# Patient Record
Sex: FEMALE | Race: WHITE | Employment: FULL TIME
[De-identification: names, ages, dates, MRNs, and addresses within clinical notes are randomized per-mention and may not be internally consistent; named-entity substitution may affect disease eponyms.]

---

## 2017-06-10 ENCOUNTER — HEALTH MAINTENANCE LETTER (OUTPATIENT)
Age: 27
End: 2017-06-10

## 2017-08-31 ENCOUNTER — OFFICE VISIT (OUTPATIENT)
Dept: FAMILY MEDICINE | Facility: CLINIC | Age: 27
End: 2017-08-31

## 2017-08-31 VITALS
HEIGHT: 65 IN | DIASTOLIC BLOOD PRESSURE: 80 MMHG | BODY MASS INDEX: 29.32 KG/M2 | SYSTOLIC BLOOD PRESSURE: 126 MMHG | OXYGEN SATURATION: 99 % | WEIGHT: 176 LBS | TEMPERATURE: 98.2 F | HEART RATE: 92 BPM

## 2017-08-31 DIAGNOSIS — Z30.09 ENCOUNTER FOR OTHER GENERAL COUNSELING OR ADVICE ON CONTRACEPTION: Primary | ICD-10-CM

## 2017-08-31 DIAGNOSIS — Z30.430 ENCOUNTER FOR IUD INSERTION: ICD-10-CM

## 2017-08-31 DIAGNOSIS — Z23 NEED FOR VACCINATION: ICD-10-CM

## 2017-08-31 DIAGNOSIS — F31.81 BIPOLAR 2 DISORDER (H): ICD-10-CM

## 2017-08-31 DIAGNOSIS — R61 NIGHT SWEATS: ICD-10-CM

## 2017-08-31 DIAGNOSIS — E78.2 MIXED HYPERLIPIDEMIA: ICD-10-CM

## 2017-08-31 LAB
ERYTHROCYTE [DISTWIDTH] IN BLOOD BY AUTOMATED COUNT: 11.6 %
HCT VFR BLD AUTO: 43.6 % (ref 35–47)
HEMOGLOBIN: 13.6 G/DL (ref 11.7–15.7)
MCH RBC QN AUTO: 29.4 PG (ref 26–33)
MCHC RBC AUTO-ENTMCNC: 31.2 G/DL (ref 31–36)
MCV RBC AUTO: 94.1 FL (ref 78–100)
PLATELET COUNT - QUEST: 491 10^9/L (ref 150–375)
RBC # BLD AUTO: 4.63 10*12/L (ref 3.8–5.2)
WBC # BLD AUTO: 8.6 10*9/L (ref 4–11)

## 2017-08-31 PROCEDURE — 99395 PREV VISIT EST AGE 18-39: CPT | Mod: 25 | Performed by: FAMILY MEDICINE

## 2017-08-31 PROCEDURE — 84443 ASSAY THYROID STIM HORMONE: CPT | Mod: 90 | Performed by: FAMILY MEDICINE

## 2017-08-31 PROCEDURE — 36415 COLL VENOUS BLD VENIPUNCTURE: CPT | Performed by: FAMILY MEDICINE

## 2017-08-31 PROCEDURE — 90686 IIV4 VACC NO PRSV 0.5 ML IM: CPT | Performed by: FAMILY MEDICINE

## 2017-08-31 PROCEDURE — 90471 IMMUNIZATION ADMIN: CPT | Performed by: FAMILY MEDICINE

## 2017-08-31 PROCEDURE — 85027 COMPLETE CBC AUTOMATED: CPT | Performed by: FAMILY MEDICINE

## 2017-08-31 PROCEDURE — 80061 LIPID PANEL: CPT | Mod: 90 | Performed by: FAMILY MEDICINE

## 2017-08-31 RX ORDER — NORGESTIMATE AND ETHINYL ESTRADIOL 7DAYSX3 28
1 KIT ORAL DAILY
Qty: 84 TABLET | Refills: 3 | Status: SHIPPED | OUTPATIENT
Start: 2017-08-31 | End: 2018-07-30

## 2017-08-31 ASSESSMENT — ANXIETY QUESTIONNAIRES
1. FEELING NERVOUS, ANXIOUS, OR ON EDGE: MORE THAN HALF THE DAYS
6. BECOMING EASILY ANNOYED OR IRRITABLE: SEVERAL DAYS
IF YOU CHECKED OFF ANY PROBLEMS ON THIS QUESTIONNAIRE, HOW DIFFICULT HAVE THESE PROBLEMS MADE IT FOR YOU TO DO YOUR WORK, TAKE CARE OF THINGS AT HOME, OR GET ALONG WITH OTHER PEOPLE: SOMEWHAT DIFFICULT
3. WORRYING TOO MUCH ABOUT DIFFERENT THINGS: SEVERAL DAYS
GAD7 TOTAL SCORE: 9
7. FEELING AFRAID AS IF SOMETHING AWFUL MIGHT HAPPEN: SEVERAL DAYS
2. NOT BEING ABLE TO STOP OR CONTROL WORRYING: SEVERAL DAYS
5. BEING SO RESTLESS THAT IT IS HARD TO SIT STILL: NEARLY EVERY DAY

## 2017-08-31 ASSESSMENT — PATIENT HEALTH QUESTIONNAIRE - PHQ9
SUM OF ALL RESPONSES TO PHQ QUESTIONS 1-9: 8
5. POOR APPETITE OR OVEREATING: NOT AT ALL

## 2017-08-31 NOTE — MR AVS SNAPSHOT
After Visit Summary   8/31/2017    Elizabeth Russo    MRN: [id number]           Patient Information     Date Of Birth          1990        Visit Information        Provider Department      8/31/2017 10:00 AM Maranda Alvarez MD [facility]        Today's Diagnoses     Encounter for IUD insertion    -  1    Encounter for other general counseling or advice on contraception        Mixed hyperlipidemia        Night sweats          Care Instructions    Due for pap smear in February- recheck of depression and pap smear if needed    When you are due for refill of your antidepressants- call your pharmacy to contact us.          Follow-ups after your visit        Additional Services     OB/GYN REFERRAL       Your provider has referred you to:  [facility] [place] [phone]   [email or web address]    Please be aware that coverage of these services is subject to the terms and limitations of your health insurance plan.  Call member services at your health plan with any benefit or coverage questions.      Please bring the following with you to your appointment:    (1) Any X-Rays, CTs or MRIs which have been performed.  Contact the facility where they were done to arrange for  prior to your scheduled appointment.   (2) List of current medications   (3) This referral request   (4) Any documents/labs given to you for this referral                  Who to contact     If you have questions or need follow up information about today's clinic visit or your schedule please contact [facility] directly at [phone].  Normal or non-critical lab and imaging results will be communicated to you by MyChart, letter or phone within 4 business days after the clinic has received the results. If you do not hear from us within 7 days, please contact the clinic through MyChart or phone. If you have a critical or abnormal lab result, we will  "notify you by phone as soon as possible.  Submit refill requests through GuidesMob or call your pharmacy and they will forward the refill request to us. Please allow 3 business days for your refill to be completed.          Additional Information About Your Visit        Litblochart Information     GuidesMob gives you secure access to your electronic health record. If you see a primary care provider, you can also send messages to your care team and make appointments. If you have questions, please call your primary care clinic.  If you do not have a primary care provider, please call [phone] and they will assist you.        Care EveryWhere ID     This is your Care EveryWhere ID. This could be used by other organizations to access your [facility] medical records  [id number]        Your Vitals Were     Pulse Temperature Height Last Period Pulse Oximetry Breastfeeding?    92 98.2  F (36.8  C) (Oral) 1.651 m (5' 5\") 08/28/2017 99% No    BMI (Body Mass Index)                   29.29 kg/m2            Blood Pressure from Last 3 Encounters:   08/31/17 126/80   10/29/16 120/62   10/21/15 112/78    Weight from Last 3 Encounters:   08/31/17 79.8 kg (176 lb)   10/29/16 78.7 kg (173 lb 9.6 oz)   10/21/15 73.3 kg (161 lb 9.6 oz)              We Performed the Following     HEMOGRAM/PLATELET (BFP)     Lipid Profile     OB/GYN REFERRAL     TSH with free T4 reflex ([facility])          Where to get your medicines      These medications were sent to [address] [address] [facility]  [address] [facility] [address]    Hours:  Tech issues with their phone system Phone:  [phone]     norgestim-eth estrad triphasic 0.18/0.215/0.25 MG-35 MCG per tablet          Primary Care Provider Office Phone # Fax #    Maranda Alvarez [phone] [address] [address]        Equal Access to Services     WILL CHIN AH: bianca Rodriguez, " ramon loydcristobalarlene matamoroscami fatima domin hayaan adeeg kharash la'aan ah. Arlen [facility] [phone].    ATENCIÓN: Si rigoberto ortiz, tiene a stiles disposición servicios gratuitos de asistencia lingüística. Germain al [phone].    We comply with applicable federal civil rights laws and [place] laws. We do not discriminate on the basis of race, color, national origin, age, disability sex, sexual orientation or gender identity.            Thank you!     Thank you for choosing [facility]  for your care. Our goal is always to provide you with excellent care. Hearing back from our patients is one way we can continue to improve our services. Please take a few minutes to complete the written survey that you may receive in the mail after your visit with us. Thank you!             Your Updated Medication List - Protect others around you: Learn how to safely use, store and throw away your medicines at www.disposemymeds.org.          This list is accurate as of: 8/31/17 10:54 AM.  Always use your most recent med list.                   Brand Name Dispense Instructions for use Diagnosis    norgestim-eth estrad triphasic 0.18/0.215/0.25 MG-35 MCG per tablet    TRI-SPRINTEC    84 tablet    Take 1 tablet by mouth daily    Encounter for other general counseling or advice on contraception       sertraline 100 MG tablet    ZOLOFT     Take  by mouth daily. 1.5 tabs daily        WELLBUTRIN  MG 12 hr tablet   Generic drug:  buPROPion      Take 150 mg by mouth 2 times daily.

## 2017-08-31 NOTE — NURSING NOTE
"Elizabeth Russo is here for a CPX. Fasting: possible Pap.    Pre-visit Planning  Immunizations -up to date  Colonoscopy -NA  Mammogram -NA  Asthma test --NA  PHQ9 -is completed today  PHQ2 - completed today  EILEEN 7 -done today  Fall Risk Assessment -NA  CAGE: completed today  Vitals:  BP Cuff right  Arm with regular Cuff  PULSE regular  176 lbs 0 oz and 5' 5\"  CLASSIFICATION OF OVERWEIGHT AND OBESITY BY BMI                        Obesity Class           BMI(kg/m2)  Underweight                                    < 18.5  Normal                                         18.5-24.9  Overweight                                     25.0-29.9  OBESITY                     I                  30.0-34.9                             II                 35.0-39.9  EXTREME OBESITY             III                >40      Patient's  BMI Body mass index is 29.29 kg/(m^2).  Http://hin.nhlbi.nih.gov/menuplanner/menu.cgi      Roomed By: TANA Corral ([facility])    "

## 2017-08-31 NOTE — PATIENT INSTRUCTIONS
Due for pap smear in February- recheck of depression and pap smear if needed    When you are due for refill of your antidepressants- call your pharmacy to contact us.

## 2017-08-31 NOTE — PROGRESS NOTES
Chief Complaint: Elizabeth Russo is an 27 year old woman who presents for preventive health visit.      Besides routine health maintenance,  she would like to discuss    1) she might be interested in IUD.     Refill of BCP today- [facility]: no history of dysmenorhea  Lighter period, five days duration on BCP    No history pregnancy  Monogamous, defers STD screening    2)Her anxiety and depression has been stable on her current medications-  Has difficulty scheduling med check appointments with her psychiatrist- she is wondering if she could get refills of her sertraline and wellbutrin through our office    3)Night sweats  ?? Side effect of antidepressant-    4) History of elevated triglycerides      Healthy Habits:  Do you get at least three servings of calcium containing foods daily (dairy, green leafy vegetables, etc.)? yes  Outside of work or daily activities, how many days per week do you exercise for 30 minutes or longer?4- 5 times a week, walk  Have you had an eye exam in the past two years? yes  Do you see a dentist twice per year? Yes    vegetarean    PHQ-2  Over the last two weeks- Have you been bothered by little interest or pleasure in doing things?  No  Over the last two weeks- Have you been feeling down, depressed, or hopeless?  No   PHQ9: scores 8    Cyclical depression symptoms      Social History   Substance Use Topics     Smoking status: Former Smoker     Quit date: 1/1/2017     Smokeless tobacco: Never Used      Comment: less then 1 pack per month     Alcohol use 3.6 oz/week     6 Standard drinks or equivalent per week         Reviewed orders with patient.  Reviewed health maintenance and updated orders accordingly - Yes      History of abnormal Pap smear: NO - age 21-29 PAP every 3 years recommended  All Histories reviewed and updated in Epic.  Menstruating- defers pap smear today  Reschedule in six months      ROS:  C: NEGATIVE for fever, chills, change in weight  I:  "NEGATIVE for worrisome rashes, moles or lesions  E: NEGATIVE for vision changes or irritation  ENT: NEGATIVE for ear, mouth and throat problems  R: NEGATIVE for significant cough or SOB  B: NEGATIVE for masses, tenderness or discharge  CV: NEGATIVE for chest pain, palpitations or peripheral edema  GI: NEGATIVE for nausea, abdominal pain, heartburn, or change in bowel habits  : NEGATIVE for unusual urinary or vaginal symptoms. Periods are regular.  M: NEGATIVE for significant arthralgias or myalgia  N: NEGATIVE for weakness, dizziness or paresthesias  P: NEGATIVE for changes in mood or affect      OBJECTIVE:  /80 (BP Location: Right arm, Patient Position: Chair, Cuff Size: Adult Regular)  Pulse 92  Temp 98.2  F (36.8  C) (Oral)  Ht 1.651 m (5' 5\")  Wt 79.8 kg (176 lb)  LMP 08/28/2017  SpO2 99%  Breastfeeding? No  BMI 29.29 kg/m2  General appearance: Healthy    Skin: Normal. No atypical appearing moles on inspection of trunk and extremities.    External ears  and canals clear bilaterally. TM's normal bilaterally. Nose normal without lesions or discharge. Oropharynx normal. Neck supple without palpable adenopathy.    Breasts are symmetric.  No dominant, discrete, fixed  or suspicious masses are noted.  No skin or nipple changes or axillary nodes.     Regular rate and  rhythm. S1 and S2 normal, no murmurs, clicks, gallops or rubs. No edema or JVD. Chest is clear; no wheezes or rales.    The abdomen is soft without tenderness, guarding, mass or organomegaly. Bowel sounds are normal. No CVA tenderness or inguinal adenopathy noted.    Pelvic: deferred    Rectal exam:deferred    Extremities: negative.      COUNSELING:  Reviewed preventive health counseling, as reflected in patient instructions       Regular exercise       Healthy diet/nutrition       Osteoporosis Prevention/Bone Health       Sunscreen/ skin exams      ATP III Guidelines  ICSI Preventive Guidelines    ASSESSMENT/PLAN:  (Z30.09) Encounter for " other general counseling or advice on contraception  (primary encounter diagnosis)  Comment:   Plan: norgestim-eth estrad triphasic (TRI-SPRINTEC)         0.18/0.215/0.25 MG-35 MCG per tablet,         HEMOGRAM/PLATELET (BFP), TSH with free T4         reflex ([facility]), VENOUS COLLECTION, CANCELED:         ThinPrep Pap rflx HPV mRNA E6/E7 ([facility])            (Z30.430) Encounter for IUD insertion  Comment:   Plan: OB/GYN REFERRAL            (E78.2) Mixed hyperlipidemia  Comment:   Plan: Lipid Profile, VENOUS COLLECTION            (R61) Night sweats  Comment: Normal CBC-   Plan: may be medication side effect    (F31.81) Bipolar 2 disorder (H)  Comment:   Plan: I will refill her current prescription as long as her depression is stable- she will have pharmacy call me when refill due  Recheck in six months    (Z23) Need for vaccination  Comment:   Plan: HC FLU VAC PRESRV FREE QUAD SPLIT VIR 3+YRS IM,        VACCINE ADMINISTRATION, INITIAL

## 2017-09-01 LAB
CHOLEST SERPL-MCNC: 199 MG/DL
CHOLEST/HDLC SERPL: 3.5 (CALC)
HDLC SERPL-MCNC: 57 MG/DL
LDLC SERPL CALC-MCNC: 107 MG/DL (CALC)
NONHDLC SERPL-MCNC: 142 MG/DL (CALC)
TRIGL SERPL-MCNC: 234 MG/DL
TSH SERPL-ACNC: 0.87 MIU/L

## 2017-09-01 ASSESSMENT — ANXIETY QUESTIONNAIRES: GAD7 TOTAL SCORE: 9

## 2018-02-22 ENCOUNTER — TELEPHONE (OUTPATIENT)
Dept: FAMILY MEDICINE | Facility: CLINIC | Age: 28
End: 2018-02-22

## 2018-02-22 RX ORDER — SERTRALINE HYDROCHLORIDE 100 MG/1
100 TABLET, FILM COATED ORAL DAILY
Qty: 30 TABLET | Refills: 0 | COMMUNITY
Start: 2018-02-22 | End: 2018-02-24

## 2018-02-23 NOTE — TELEPHONE ENCOUNTER
Ok one month of sertraline sent to [facility] . Pt needs non fasting ov for refills.     Please schedule a follow up visit,    Thank you,  Lupe

## 2018-02-24 DIAGNOSIS — F31.81 BIPOLAR 2 DISORDER (H): Primary | ICD-10-CM

## 2018-02-24 NOTE — TELEPHONE ENCOUNTER
Pt is requesting a refill of the following    Pending Prescriptions:                       Disp   Refills    sertraline (ZOLOFT) 100 MG tablet         90 tab*1            Sig: Take 1 tablet (100 mg) by mouth daily 1.5 tabs           daily    I tried to call in a 30 day but received a notice from the pharmacy that an alternative is requested because they will only do a 90 day with this medication.     I have a 90 day pending now but pt hasn't been seen since 8-31-17 and was due back in 6 months per last ov notes. The  called her to schedule a appointment but she hasn't done that or called back.    Please advise    Lupe

## 2018-02-25 DIAGNOSIS — F31.81 BIPOLAR 2 DISORDER (H): ICD-10-CM

## 2018-02-25 RX ORDER — SERTRALINE HYDROCHLORIDE 100 MG/1
100 TABLET, FILM COATED ORAL DAILY
Qty: 45 TABLET | Refills: 0 | Status: SHIPPED | OUTPATIENT
Start: 2018-02-25 | End: 2018-02-25

## 2018-02-25 RX ORDER — SERTRALINE HYDROCHLORIDE 100 MG/1
100 TABLET, FILM COATED ORAL DAILY
Qty: 135 TABLET | Refills: 0 | Status: SHIPPED | OUTPATIENT
Start: 2018-02-25 | End: 2018-02-27

## 2018-02-27 ENCOUNTER — TELEPHONE (OUTPATIENT)
Dept: FAMILY MEDICINE | Facility: CLINIC | Age: 28
End: 2018-02-27

## 2018-02-27 DIAGNOSIS — F31.81 BIPOLAR 2 DISORDER (H): ICD-10-CM

## 2018-02-27 RX ORDER — SERTRALINE HYDROCHLORIDE 100 MG/1
TABLET, FILM COATED ORAL
Qty: 135 TABLET | Refills: 0 | Status: SHIPPED | OUTPATIENT
Start: 2018-02-27 | End: 2018-05-25

## 2018-02-27 NOTE — TELEPHONE ENCOUNTER
Please verify the Sig for the sertraline - is she to take 1 tab daily of 1.5 tabs daily?  This will need to be called to [facility] at [phone] once clarified

## 2018-05-25 ENCOUNTER — TELEPHONE (OUTPATIENT)
Dept: FAMILY MEDICINE | Facility: CLINIC | Age: 28
End: 2018-05-25

## 2018-05-25 DIAGNOSIS — F31.81 BIPOLAR 2 DISORDER (H): ICD-10-CM

## 2018-05-25 RX ORDER — SERTRALINE HYDROCHLORIDE 100 MG/1
TABLET, FILM COATED ORAL
Qty: 45 TABLET | Refills: 0 | COMMUNITY
Start: 2018-05-25 | End: 2018-07-30

## 2018-05-25 NOTE — TELEPHONE ENCOUNTER
Elizabeth Russo is due for a medication recheck for the following medication Sertraline ,and meets the qualifications for a physician approved 30 day extension.    A #30 day refill has been called into the pharmacy listed.     staff, per the refill protocol can you please call the patient and help assist in setting up a NON FASTING medication recheck.  Please attempt to reach the patient to schedule that appointment, and if you are unable to reach them, please forward back to the prescribing physician.      Telephone Information:   Mobile [phone] (home)       Thank You  TANA Cruz

## 2018-06-16 ENCOUNTER — HEALTH MAINTENANCE LETTER (OUTPATIENT)
Age: 28
End: 2018-06-16

## 2018-07-30 ENCOUNTER — OFFICE VISIT (OUTPATIENT)
Dept: FAMILY MEDICINE | Facility: CLINIC | Age: 28
End: 2018-07-30

## 2018-07-30 VITALS
HEART RATE: 79 BPM | WEIGHT: 157 LBS | BODY MASS INDEX: 26.13 KG/M2 | DIASTOLIC BLOOD PRESSURE: 72 MMHG | TEMPERATURE: 98.7 F | OXYGEN SATURATION: 99 % | SYSTOLIC BLOOD PRESSURE: 118 MMHG

## 2018-07-30 DIAGNOSIS — F31.81 BIPOLAR 2 DISORDER (H): Primary | ICD-10-CM

## 2018-07-30 PROCEDURE — 99213 OFFICE O/P EST LOW 20 MIN: CPT | Performed by: FAMILY MEDICINE

## 2018-07-30 RX ORDER — SERTRALINE HYDROCHLORIDE 100 MG/1
TABLET, FILM COATED ORAL
Qty: 135 TABLET | Refills: 1 | Status: SHIPPED | OUTPATIENT
Start: 2018-07-30 | End: 2019-03-18

## 2018-07-30 RX ORDER — BUPROPION HYDROCHLORIDE 150 MG/1
150 TABLET, EXTENDED RELEASE ORAL DAILY
Qty: 90 TABLET | Refills: 1 | Status: SHIPPED | OUTPATIENT
Start: 2018-07-30 | End: 2019-03-18

## 2018-07-30 ASSESSMENT — PATIENT HEALTH QUESTIONNAIRE - PHQ9: 5. POOR APPETITE OR OVEREATING: MORE THAN HALF THE DAYS

## 2018-07-30 ASSESSMENT — ANXIETY QUESTIONNAIRES
IF YOU CHECKED OFF ANY PROBLEMS ON THIS QUESTIONNAIRE, HOW DIFFICULT HAVE THESE PROBLEMS MADE IT FOR YOU TO DO YOUR WORK, TAKE CARE OF THINGS AT HOME, OR GET ALONG WITH OTHER PEOPLE: VERY DIFFICULT
GAD7 TOTAL SCORE: 17
5. BEING SO RESTLESS THAT IT IS HARD TO SIT STILL: NEARLY EVERY DAY
3. WORRYING TOO MUCH ABOUT DIFFERENT THINGS: NEARLY EVERY DAY
1. FEELING NERVOUS, ANXIOUS, OR ON EDGE: NEARLY EVERY DAY
7. FEELING AFRAID AS IF SOMETHING AWFUL MIGHT HAPPEN: MORE THAN HALF THE DAYS
2. NOT BEING ABLE TO STOP OR CONTROL WORRYING: MORE THAN HALF THE DAYS
6. BECOMING EASILY ANNOYED OR IRRITABLE: MORE THAN HALF THE DAYS

## 2018-07-30 NOTE — NURSING NOTE
Elizabeth is here for medication check.    Pre-visit Screening:  Immunizations:  up to date  Colonoscopy:  NA  Mammogram: NA  Asthma Action Test/Plan:  No concerns  PHQ9:  PHQ-9 given today   GAD7:  EILEEN-7 given today   Questioned patient about current smoking habits Pt. quit smoking some time ago.  Ok to leave detailed message on voice mail for today's visit only Yes, phone # [phone]

## 2018-07-30 NOTE — PROGRESS NOTES
"  SUBJECTIVE:   Elizabeth Russo is a 28 year old female who presents to clinic today for the following health issues:    Depression Followup    Status since last visit: Worsened - :     See PHQ-9 for current symptoms.  Other associated symptoms: increased anxiety-    Complicating factors:   Significant life event:  Yes-   Moving to [place] end of the week- moving in with her boyfriend, changing her job within the same company  Current substance abuse:  None  Anxiety or Panic symptoms:  Yes-      PHQ-9 8/31/2017   Total Score 8   Q9: Suicide Ideation Not at all       Amount of exercise or physical activity: regular exercise encouraged- she walks her dog daily    Problems taking medications regularly: No    Medication side effects: none    Diet: regular (no restrictions)     PMH:    Hypomania - hospitalized when age 18  Diagnosed bipolar age 20  Symptoms controlled in her twenties      [facility]  She has a Mirena- worries that she is having systemic side effects from Progesterone- \"brain fog\"    Problem list and histories reviewed & adjusted, as indicated.  Additional history: as documented    Patient Active Problem List   Diagnosis     Bipolar 2 disorder (H)     Health Care Home     ASCUS on Pap smear     ACP (advance care planning)     Past Surgical History:   Procedure Laterality Date     HC CREATE EARDRUM OPENING,GEN ANESTH  age 6    P.E. Tubes     HC TOOTH EXTRACTION W/FORCEP  age 17    wisdom teeth       Social History   Substance Use Topics     Smoking status: Former Smoker     Quit date: 1/1/2017     Smokeless tobacco: Never Used      Comment: less then 1 pack per month     Alcohol use 3.6 oz/week     6 Standard drinks or equivalent per week     Family History   Problem Relation Age of Onset     Genitourinary Problems Sister      bedwetting     Depression Mother      anxiety/depression     Depression Maternal Grandmother      Depression Paternal Aunt      " suicide/depression     Hypertension Father      Lipids Father      Hypertension Paternal Grandmother      Lipids Paternal Grandmother      Breast Cancer Paternal Grandmother 60     Hypertension Paternal Grandfather      Lipids Paternal Grandfather          Current Outpatient Prescriptions   Medication Sig Dispense Refill     buPROPion (WELLBUTRIN SR) 150 MG 12 hr tablet Take 1 tablet (150 mg) by mouth daily 90 tablet 1     sertraline (ZOLOFT) 100 MG tablet 1.5 tabs daily 135 tablet 1       Reviewed and updated as needed this visit by clinical staff       Reviewed and updated as needed this visit by Provider         ROS:  CONSTITUTIONAL: NEGATIVE for fever, chills, change in weight  ENT/MOUTH: NEGATIVE for ear, mouth and throat problems  RESP: NEGATIVE for significant cough or SOB  CV: NEGATIVE for chest pain, palpitations or peripheral edema  GI: NEGATIVE for nausea, abdominal pain, heartburn, or change in bowel habits  MUSCULOSKELETAL: NEGATIVE for significant arthralgias or myalgia  ENDOCRINE: NEGATIVE for temperature intolerance, skin/hair changes    OBJECTIVE:     /72 (BP Location: Right arm, Patient Position: Sitting, Cuff Size: Adult Regular)  Pulse 79  Temp 98.7  F (37.1  C) (Oral)  Wt 71.2 kg (157 lb)  SpO2 99%  BMI 26.13 kg/m2  Body mass index is 26.13 kg/(m^2).   ALert and oriented times 3; Coherent speech/ increased rate and volume, able to articulate, logical thoughts, able to abstract reason, no tangential thoughts, no hallucinations or delusions. Affect is pleasant        Diagnostic Test Results:  none     ASSESSMENT/PLAN:     Problem List Items Addressed This Visit     Bipolar 2 disorder (H) - Primary    Relevant Medications    sertraline (ZOLOFT) 100 MG tablet    buPROPion (WELLBUTRIN SR) 150 MG 12 hr tablet            Assessment   Symptoms of anxiety and depression-  Previous diagnosis of bipolar.    We talked about increased risk for relapse with major life change-  Importance of sleep-  she is getting 6-6.5 hours a night- not adequate  We talked about warning signs- for rigoberto-  I am concerned about her medications- not indicated for bipolar disorder-  With five days before she leaves- sertraline was refilled at her current dose  wellbutrin dose reduced to a single tablet in am (dc afternoon dose)  She plans to see therapist as soon as she arrives in the [facility]  I strongly advise psychiatry to review her medications      No follow up planned at our clinic-  She will establish care with mental health providers in [facility]    More than 50% of visit spent in counseling.    Maranda Alvarez MD  [facility]

## 2018-07-30 NOTE — MR AVS SNAPSHOT
After Visit Summary   7/30/2018    Elizabeth Russo    MRN: [id number]           Patient Information     Date Of Birth          1990        Visit Information        Provider Department      7/30/2018 4:00 PM Maranda Alvarez MD [facility]        Today's Diagnoses     Bipolar 2 disorder (H)    -  1       Follow-ups after your visit        Who to contact     If you have questions or need follow up information about today's clinic visit or your schedule please contact [facility] directly at [phone].  Normal or non-critical lab and imaging results will be communicated to you by Conviohart, letter or phone within 4 business days after the clinic has received the results. If you do not hear from us within 7 days, please contact the clinic through Conviohart or phone. If you have a critical or abnormal lab result, we will notify you by phone as soon as possible.  Submit refill requests through [employer] or call your pharmacy and they will forward the refill request to us. Please allow 3 business days for your refill to be completed.          Additional Information About Your Visit        MyChart Information     [employer] gives you secure access to your electronic health record. If you see a primary care provider, you can also send messages to your care team and make appointments. If you have questions, please call your primary care clinic.  If you do not have a primary care provider, please call [phone] and they will assist you.        Care EveryWhere ID     This is your Care EveryWhere ID. This could be used by other organizations to access your [facility] [place] medical records  [id number]        Your Vitals Were     Pulse Temperature Pulse Oximetry BMI (Body Mass Index)          79 98.7  F (37.1  C) (Oral) 99% 26.13 kg/m2         Blood Pressure from Last 3 Encounters:   07/30/18 118/72   08/31/17 126/80   10/29/16 120/62    Weight from Last 3 Encounters:    07/30/18 71.2 kg (157 lb)   08/31/17 79.8 kg (176 lb)   10/29/16 78.7 kg (173 lb 9.6 oz)              Today, you had the following     No orders found for display         Today's Medication Changes          These changes are accurate as of 7/30/18 11:59 PM.  If you have any questions, ask your nurse or doctor.               These medicines have changed or have updated prescriptions.        Dose/Directions    buPROPion 150 MG 12 hr tablet   Commonly known as:  WELLBUTRIN SR   This may have changed:  when to take this   Used for:  Bipolar 2 disorder (H)   Changed by:  Maranda Alvarez MD        Dose:  150 mg   Take 1 tablet (150 mg) by mouth daily   Quantity:  90 tablet   Refills:  1            Where to get your medicines      These medications were sent to [place] [address] [place] [id number] [address]    Hours:  Tech issues with their phone system Phone:  [phone]     buPROPion 150 MG 12 hr tablet    sertraline 100 MG tablet                Primary Care Provider Office Phone # Fax #    Maranda Alvarez [phone] [address] [address]        Equal Access to Services     WILL CHIN AH: Hadii amanda ku hadasho Soomaali, waaxda luqadaha, qaybta kaalmada adeegyada, waxay domin hayerynn saturnino rose. So [facility] [phone].    ATENCIÓN: Si habla español, tiene a stiles disposición servicios gratuitos de asistencia lingüística. Llame al [phone].    We comply with applicable federal civil rights laws and [place] laws. We do not discriminate on the basis of race, color, national origin, age, disability, sex, sexual orientation, or gender identity.            Thank you!     Thank you for choosing [facility]  for your care. Our goal is always to provide you with excellent care. Hearing back from our patients is one way we can continue to improve our services. Please take a few  minutes to complete the written survey that you may receive in the mail after your visit with us. Thank you!             Your Updated Medication List - Protect others around you: Learn how to safely use, store and throw away your medicines at www.disposemymeds.org.          This list is accurate as of 7/30/18 11:59 PM.  Always use your most recent med list.                   Brand Name Dispense Instructions for use Diagnosis    buPROPion 150 MG 12 hr tablet    WELLBUTRIN SR    90 tablet    Take 1 tablet (150 mg) by mouth daily    Bipolar 2 disorder (H)       sertraline 100 MG tablet    ZOLOFT    135 tablet    1.5 tabs daily    Bipolar 2 disorder (H)

## 2018-07-31 ASSESSMENT — PATIENT HEALTH QUESTIONNAIRE - PHQ9: SUM OF ALL RESPONSES TO PHQ QUESTIONS 1-9: 9

## 2018-07-31 ASSESSMENT — ANXIETY QUESTIONNAIRES: GAD7 TOTAL SCORE: 17

## 2019-03-18 ENCOUNTER — TELEPHONE (OUTPATIENT)
Dept: FAMILY MEDICINE | Facility: CLINIC | Age: 29
End: 2019-03-18

## 2019-03-18 DIAGNOSIS — F31.81 BIPOLAR 2 DISORDER (H): ICD-10-CM

## 2019-03-18 RX ORDER — BUPROPION HYDROCHLORIDE 150 MG/1
150 TABLET, EXTENDED RELEASE ORAL DAILY
Qty: 30 TABLET | Refills: 0 | COMMUNITY
Start: 2019-03-18 | End: 2019-06-29

## 2019-03-18 RX ORDER — SERTRALINE HYDROCHLORIDE 100 MG/1
TABLET, FILM COATED ORAL
Qty: 45 TABLET | Refills: 0 | COMMUNITY
Start: 2019-03-18 | End: 2019-06-29

## 2019-03-18 NOTE — TELEPHONE ENCOUNTER
Received incoming faxed refill request from [facility] pharmacy in [facility] for   Pending Prescriptions:                       Disp   Refills    sertraline (ZOLOFT) 100 MG tablet         135 ta*1            Si.5 tabs daily  Pending Prescriptions:                       Disp   Refills    buPROPion (WELLBUTRIN SR) 150 MG 12 hr ta*90 tab*1            Sig: Take 1 tablet (150 mg) by mouth daily    Signed Prescriptions:                        Disp   Refills    sertraline (ZOLOFT) 100 MG tablet          45 tab*0        Si.5 tabs daily  Authorizing Provider: NAVJOT PEREZ  Ordering User: DREW AVENDANO      Patient last had refills of these medications on 18 and this was the last time the patient was seen. She was told to return in 6 months so she is now due, a 30 day was faxed into the pharmacy as an extension. Routing to  to call and schedule patient.

## 2019-06-29 ENCOUNTER — OFFICE VISIT (OUTPATIENT)
Dept: FAMILY MEDICINE | Facility: CLINIC | Age: 29
End: 2019-06-29

## 2019-06-29 VITALS
WEIGHT: 164.2 LBS | OXYGEN SATURATION: 98 % | BODY MASS INDEX: 26.39 KG/M2 | HEIGHT: 66 IN | TEMPERATURE: 98.5 F | DIASTOLIC BLOOD PRESSURE: 64 MMHG | HEART RATE: 116 BPM | SYSTOLIC BLOOD PRESSURE: 110 MMHG | RESPIRATION RATE: 16 BRPM

## 2019-06-29 DIAGNOSIS — F41.1 GAD (GENERALIZED ANXIETY DISORDER): Primary | ICD-10-CM

## 2019-06-29 DIAGNOSIS — F31.81 BIPOLAR 2 DISORDER (H): ICD-10-CM

## 2019-06-29 PROCEDURE — 99214 OFFICE O/P EST MOD 30 MIN: CPT | Performed by: FAMILY MEDICINE

## 2019-06-29 RX ORDER — BUPROPION HYDROCHLORIDE 150 MG/1
150 TABLET, EXTENDED RELEASE ORAL DAILY
Qty: 90 TABLET | Refills: 1 | Status: SHIPPED | OUTPATIENT
Start: 2019-06-29

## 2019-06-29 RX ORDER — SERTRALINE HYDROCHLORIDE 100 MG/1
TABLET, FILM COATED ORAL
Qty: 145 TABLET | Refills: 1 | Status: SHIPPED | OUTPATIENT
Start: 2019-06-29

## 2019-06-29 ASSESSMENT — ANXIETY QUESTIONNAIRES
6. BECOMING EASILY ANNOYED OR IRRITABLE: SEVERAL DAYS
GAD7 TOTAL SCORE: 4
3. WORRYING TOO MUCH ABOUT DIFFERENT THINGS: NOT AT ALL
IF YOU CHECKED OFF ANY PROBLEMS ON THIS QUESTIONNAIRE, HOW DIFFICULT HAVE THESE PROBLEMS MADE IT FOR YOU TO DO YOUR WORK, TAKE CARE OF THINGS AT HOME, OR GET ALONG WITH OTHER PEOPLE: SOMEWHAT DIFFICULT
1. FEELING NERVOUS, ANXIOUS, OR ON EDGE: SEVERAL DAYS
2. NOT BEING ABLE TO STOP OR CONTROL WORRYING: NOT AT ALL
7. FEELING AFRAID AS IF SOMETHING AWFUL MIGHT HAPPEN: NOT AT ALL
5. BEING SO RESTLESS THAT IT IS HARD TO SIT STILL: SEVERAL DAYS

## 2019-06-29 ASSESSMENT — PATIENT HEALTH QUESTIONNAIRE - PHQ9
SUM OF ALL RESPONSES TO PHQ QUESTIONS 1-9: 4
5. POOR APPETITE OR OVEREATING: SEVERAL DAYS

## 2019-06-29 ASSESSMENT — MIFFLIN-ST. JEOR: SCORE: 1483.62

## 2019-06-29 NOTE — PROGRESS NOTES
"SUBJECTIVE:  Elizabeth Russo is an 29 year old female who presents for follow-up   of bipolar illness with anxiety/depression symptoms. Living in [place] and needs a new MD there.      Initially evaluated age 15 with [facility] [employer] with counseling/ then age 18 tested with a psychiatrists for anxiety. DR Yuridia Lockwood took over in [place] for many years. This routine for 8 years.     Current symptoms include   irritablility.   Symptoms that have subjectively improved include depressed mood, hopelessness, diminished interest or pleasure in activities, insomnia, psychomotor agitation (restless and /or feeling on edge), difficulty with concentration, anxiety.  Previous and current treatment modalities   employed include group therapy, individual therapy and medication(s) Tricyclics, Prozac (fluoxetine), Celexa (citalopram), LEXAPRO (escitalopram), Wellbutrin (bupropion) and Seroquel.     Organic causes of depression present: strong family history    Current Outpatient Medications   Medication     buPROPion (WELLBUTRIN SR) 150 MG 12 hr tablet     sertraline (ZOLOFT) 100 MG tablet     No current facility-administered medications for this visit.      Allergies   Allergen Reactions     Amoxicillin Rash     Penicillins Rash     Side effects of medication: none    Social History     Tobacco Use     Smoking status: Former Smoker     Last attempt to quit: 2017     Years since quittin.4     Smokeless tobacco: Never Used     Tobacco comment: less then 1 pack per month   Substance Use Topics     Alcohol use: Yes     Alcohol/week: 3.6 oz     Types: 6 Standard drinks or equivalent per week         Neurologic: negative  Psychiatric: excessive stress-living in [place]/wants to move back home  Endocrine: negative    OBJECTIVE:  /64 (BP Location: Left arm, Patient Position: Sitting, Cuff Size: Adult Large)   Pulse 116   Temp 98.5  F (36.9  C) (Oral)   Ht 1.664 m (5' 5.5\")   Wt 74.5 kg (164 lb 3.2 oz)   SpO2 98% "   BMI 26.91 kg/m    Mental Status Examination  Posture and motor behavior: negative  Dress, grooming, personal hygiene: negative  Facial expression: negative  Speech: negative  Mood: negative  Coherency and relevance of thought: negative  Thought content: negative  Perceptions: negative  Orientation: negative  Attention and concentration: negative  Memory: : negative  Information: negative  Vocabulary: negative  Abstract reasoning: negative  Judgment: negative    General appearance: healthy, alert, no distress, cooperative and smiling  Eyes: conjunctivae/corneas clear. PERRL, EOM's intact. Fundi benign  Ears: negative  Oropharynx: Lips, mucosa, and tongue normal. Teeth and gums normal.  Neck: Neck supple. No adenopathy. Thyroid symmetric, normal size,, Carotids without bruits.  Lungs: negative, Percussion normal. Good diaphragmatic excursion. Lungs clear  Heart: negative, PMI normal. No lifts, heaves, or thrills. RRR. No murmurs, clicks gallops or rub  Abdomen: Abdomen soft, non-tender. BS normal. No masses, organomegaly  Neuro: Gait normal. Reflexes normal and symmetric. Sensation grossly WNL.  BMI : Body mass index is 26.91 kg/m .    ASSESSMENT:(F41.1) EILEEN (generalized anxiety disorder)  (primary encounter diagnosis)  Comment: I've explained to her that drugs of the SSRI class can have side effects such as weight gain, sexual dysfunction, insomnia, headache, nausea. These medications are generally effective at alleviating symptoms of anxiety and/or depression. Let me know if significant side effects do occur.    Plan: buPROPion (WELLBUTRIN SR) 150 MG 12 hr tablet,         sertraline (ZOLOFT) 100 MG tablet        Encouraged clinic change to [facility]/ insurance issues reviewed    (F31.81) Bipolar 2 disorder (H)  Plan: buPROPion (WELLBUTRIN SR) 150 MG 12 hr tablet,         sertraline (ZOLOFT) 100 MG tablet        Recheck 6 months prn.

## 2019-06-29 NOTE — PATIENT INSTRUCTIONS
EILEEN (generalized anxiety disorder)  (primary encounter diagnosis)  Comment: I've explained to her that drugs of the SSRI class can have side effects such as weight gain, sexual dysfunction, insomnia, headache, nausea. These medications are generally effective at alleviating symptoms of anxiety and/or depression. Let me know if significant side effects do occur.    Plan: buPROPion (WELLBUTRIN SR) 150 MG 12 hr tablet,         sertraline (ZOLOFT) 100 MG tablet        Encouraged clinic change to [facility]/ insurance issues reviewed    (F3.21) Bipolar 2 disorder (H)  Plan: buPROPion (WELLBUTRIN SR) 150 MG 12 hr tablet,         sertraline (ZOLOFT) 100 MG tablet        Recheck 6 months prn.

## 2019-06-29 NOTE — NURSING NOTE
Elizabeth is here today for a med recheck.    Pre-visit Screening:  Immunizations:  up to date  Colonoscopy:  NA  Mammogram: NA  Asthma Action Test/Plan:  NA  PHQ9:  Done today  GAD7:  Done today  Questioned patient about current smoking habits Pt. has never smoked.  Ok to leave detailed message on voice mail for today's visit only Yes, phone # [phone]

## 2019-06-30 ASSESSMENT — ANXIETY QUESTIONNAIRES: GAD7 TOTAL SCORE: 4

## 2019-12-09 ENCOUNTER — HEALTH MAINTENANCE LETTER (OUTPATIENT)
Age: 29
End: 2019-12-09

## 2023-07-12 LAB — CYTOLOGY CVX/VAG DOC THIN PREP: NORMAL

## 2024-11-20 ENCOUNTER — LAB SERVICES (OUTPATIENT)
Dept: LAB | Age: 34
End: 2024-11-20

## 2024-11-20 ENCOUNTER — OFFICE VISIT (OUTPATIENT)
Dept: INTERNAL MEDICINE | Age: 34
End: 2024-11-20

## 2024-11-20 VITALS
BODY MASS INDEX: 25.32 KG/M2 | DIASTOLIC BLOOD PRESSURE: 83 MMHG | WEIGHT: 157.52 LBS | TEMPERATURE: 97.6 F | HEART RATE: 86 BPM | HEIGHT: 66 IN | OXYGEN SATURATION: 100 % | RESPIRATION RATE: 18 BRPM | SYSTOLIC BLOOD PRESSURE: 121 MMHG

## 2024-11-20 DIAGNOSIS — F41.1 GAD (GENERALIZED ANXIETY DISORDER): ICD-10-CM

## 2024-11-20 DIAGNOSIS — Z00.00 ANNUAL PHYSICAL EXAM: Primary | ICD-10-CM

## 2024-11-20 DIAGNOSIS — R00.2 PALPITATIONS: ICD-10-CM

## 2024-11-20 DIAGNOSIS — Z23 NEED FOR VACCINATION: ICD-10-CM

## 2024-11-20 DIAGNOSIS — Z00.00 ANNUAL PHYSICAL EXAM: ICD-10-CM

## 2024-11-20 PROBLEM — F31.81 BIPOLAR 2 DISORDER  (CMD): Status: ACTIVE | Noted: 2024-11-20

## 2024-11-20 LAB
ALBUMIN SERPL-MCNC: 4.7 G/DL (ref 3.4–5)
ALBUMIN/GLOB SERPL: 1.5 {RATIO} (ref 1–2.4)
ALP SERPL-CCNC: 49 UNITS/L (ref 45–117)
ALT SERPL-CCNC: 17 UNITS/L
ANION GAP SERPL CALC-SCNC: 9 MMOL/L (ref 7–19)
AST SERPL-CCNC: 12 UNITS/L
BASOPHILS # BLD: 0 K/MCL (ref 0–0.3)
BASOPHILS NFR BLD: 1 %
BILIRUB SERPL-MCNC: 0.6 MG/DL (ref 0.2–1)
BUN SERPL-MCNC: 8 MG/DL (ref 6–20)
BUN/CREAT SERPL: 11 (ref 7–25)
CALCIUM SERPL-MCNC: 9.5 MG/DL (ref 8.4–10.2)
CHLORIDE SERPL-SCNC: 107 MMOL/L (ref 97–110)
CHOLEST SERPL-MCNC: 148 MG/DL
CHOLEST/HDLC SERPL: 3.1 {RATIO}
CO2 SERPL-SCNC: 28 MMOL/L (ref 21–32)
CREAT SERPL-MCNC: 0.7 MG/DL (ref 0.51–0.95)
DEPRECATED RDW RBC: 40.3 FL (ref 39–50)
EGFRCR SERPLBLD CKD-EPI 2021: >90 ML/MIN/{1.73_M2}
EOSINOPHIL # BLD: 0 K/MCL (ref 0–0.5)
EOSINOPHIL NFR BLD: 1 %
ERYTHROCYTE [DISTWIDTH] IN BLOOD: 11.7 % (ref 11–15)
FASTING DURATION TIME PATIENT: 11 HOURS (ref 0–999)
GLOBULIN SER-MCNC: 3.2 G/DL (ref 2–4)
GLUCOSE SERPL-MCNC: 98 MG/DL (ref 70–99)
HBA1C MFR BLD: 4.6 % (ref 4.5–5.6)
HCT VFR BLD CALC: 43.1 % (ref 36–46.5)
HDLC SERPL-MCNC: 47 MG/DL
HGB BLD-MCNC: 14.4 G/DL (ref 12–15.5)
IMM GRANULOCYTES # BLD AUTO: 0 K/MCL (ref 0–0.2)
IMM GRANULOCYTES # BLD: 0 %
LDLC SERPL CALC-MCNC: 79 MG/DL
LYMPHOCYTES # BLD: 1.5 K/MCL (ref 1–4.8)
LYMPHOCYTES NFR BLD: 29 %
MCH RBC QN AUTO: 31.2 PG (ref 26–34)
MCHC RBC AUTO-ENTMCNC: 33.4 G/DL (ref 32–36.5)
MCV RBC AUTO: 93.3 FL (ref 78–100)
MONOCYTES # BLD: 0.4 K/MCL (ref 0.3–0.9)
MONOCYTES NFR BLD: 8 %
NEUTROPHILS # BLD: 3.2 K/MCL (ref 1.8–7.7)
NEUTROPHILS NFR BLD: 61 %
NONHDLC SERPL-MCNC: 101 MG/DL
NRBC BLD MANUAL-RTO: 0 /100 WBC
PLATELET # BLD AUTO: 370 K/MCL (ref 140–450)
POTASSIUM SERPL-SCNC: 3.9 MMOL/L (ref 3.4–5.1)
PROT SERPL-MCNC: 7.9 G/DL (ref 6.4–8.2)
RBC # BLD: 4.62 MIL/MCL (ref 4–5.2)
SODIUM SERPL-SCNC: 140 MMOL/L (ref 135–145)
TRIGL SERPL-MCNC: 112 MG/DL
TSH SERPL-ACNC: 1.94 MCUNITS/ML (ref 0.35–5)
WBC # BLD: 5.2 K/MCL (ref 4.2–11)

## 2024-11-20 PROCEDURE — 83036 HEMOGLOBIN GLYCOSYLATED A1C: CPT | Performed by: CLINICAL MEDICAL LABORATORY

## 2024-11-20 PROCEDURE — 80061 LIPID PANEL: CPT | Performed by: CLINICAL MEDICAL LABORATORY

## 2024-11-20 PROCEDURE — 36415 COLL VENOUS BLD VENIPUNCTURE: CPT | Performed by: INTERNAL MEDICINE

## 2024-11-20 PROCEDURE — 85025 COMPLETE CBC W/AUTO DIFF WBC: CPT | Performed by: CLINICAL MEDICAL LABORATORY

## 2024-11-20 PROCEDURE — 84443 ASSAY THYROID STIM HORMONE: CPT | Performed by: CLINICAL MEDICAL LABORATORY

## 2024-11-20 PROCEDURE — 80053 COMPREHEN METABOLIC PANEL: CPT | Performed by: CLINICAL MEDICAL LABORATORY

## 2024-11-20 RX ORDER — LEVOFLOXACIN 500 MG/1
TABLET, FILM COATED ORAL
COMMUNITY

## 2024-11-20 RX ORDER — CHLORHEXIDINE GLUCONATE ORAL RINSE 1.2 MG/ML
SOLUTION DENTAL
COMMUNITY

## 2024-11-20 RX ORDER — ESCITALOPRAM OXALATE 5 MG/1
5 TABLET ORAL DAILY
Qty: 30 TABLET | Refills: 1 | Status: SHIPPED | OUTPATIENT
Start: 2024-11-20

## 2024-11-20 RX ORDER — CLINDAMYCIN HYDROCHLORIDE 150 MG/1
CAPSULE ORAL
COMMUNITY

## 2024-11-20 SDOH — ECONOMIC STABILITY: TRANSPORTATION INSECURITY
IN THE PAST 12 MONTHS, HAS LACK OF RELIABLE TRANSPORTATION KEPT YOU FROM MEDICAL APPOINTMENTS, MEETINGS, WORK OR FROM GETTING THINGS NEEDED FOR DAILY LIVING?: NO

## 2024-11-20 SDOH — ECONOMIC STABILITY: GENERAL: WOULD YOU LIKE HELP WITH ANY OF THE FOLLOWING NEEDS?: I DON'T WANT HELP WITH ANY OF THESE

## 2024-11-20 SDOH — ECONOMIC STABILITY: HOUSING INSECURITY: WHAT IS YOUR LIVING SITUATION TODAY?: I HAVE A STEADY PLACE TO LIVE

## 2024-11-20 SDOH — ECONOMIC STABILITY: FOOD INSECURITY: WITHIN THE PAST 12 MONTHS, THE FOOD YOU BOUGHT JUST DIDN'T LAST AND YOU DIDN'T HAVE MONEY TO GET MORE.: NEVER TRUE

## 2024-11-20 SDOH — ECONOMIC STABILITY: HOUSING INSECURITY: DO YOU HAVE PROBLEMS WITH ANY OF THE FOLLOWING?: NONE OF THE ABOVE

## 2024-11-20 ASSESSMENT — ANXIETY QUESTIONNAIRES
6. BECOMING EASILY ANNOYED OR IRRITABLE: MORE THAN HALF THE DAYS
2. NOT BEING ABLE TO STOP OR CONTROL WORRYING: 2
3. WORRYING TOO MUCH ABOUT DIFFERENT THINGS: NEARLY EVERY DAY
4. TROUBLE RELAXING: MORE THAN HALF THE DAYS
1. FEELING NERVOUS, ANXIOUS, OR ON EDGE: NEARLY EVERY DAY
5. BEING SO RESTLESS THAT IT IS HARD TO SIT STILL: 1
1. FEELING NERVOUS, ANXIOUS, OR ON EDGE: 3
7. FEELING AFRAID AS IF SOMETHING AWFUL MIGHT HAPPEN: SEVERAL DAYS
7. FEELING AFRAID AS IF SOMETHING AWFUL MIGHT HAPPEN: 1
GAD7 TOTAL SCORE: 14
IF YOU CHECKED OFF ANY PROBLEMS ON THIS QUESTIONNAIRE, HOW DIFFICULT HAVE THESE PROBLEMS MADE IT FOR YOU TO DO YOUR WORK, TAKE CARE OF THINGS AT HOME, OR GET ALONG WITH OTHER PEOPLE: VERY DIFFICULT
2. NOT BEING ABLE TO STOP OR CONTROL WORRYING: MORE THAN HALF THE DAYS
5. BEING SO RESTLESS THAT IT IS HARD TO SIT STILL: SEVERAL DAYS
6. BECOMING EASILY ANNOYED OR IRRITABLE: 2
3. WORRYING TOO MUCH ABOUT DIFFERENT THINGS: 3
4. TROUBLE RELAXING: 2

## 2024-11-20 ASSESSMENT — PATIENT HEALTH QUESTIONNAIRE - PHQ9
SUM OF ALL RESPONSES TO PHQ9 QUESTIONS 1 AND 2: 0
2. FEELING DOWN, DEPRESSED OR HOPELESS: NOT AT ALL
1. LITTLE INTEREST OR PLEASURE IN DOING THINGS: NOT AT ALL
CLINICAL INTERPRETATION OF PHQ2 SCORE: NO FURTHER SCREENING NEEDED
SUM OF ALL RESPONSES TO PHQ9 QUESTIONS 1 AND 2: 0

## 2024-11-20 ASSESSMENT — PAIN SCALES - GENERAL: PAINLEVEL: 0

## 2024-11-20 ASSESSMENT — SOCIAL DETERMINANTS OF HEALTH (SDOH): IN THE PAST 12 MONTHS, HAS THE ELECTRIC, GAS, OIL, OR WATER COMPANY THREATENED TO SHUT OFF SERVICE IN YOUR HOME?: NO

## 2024-11-26 ENCOUNTER — TELEPHONE (OUTPATIENT)
Dept: INTERNAL MEDICINE | Age: 34
End: 2024-11-26

## 2025-01-08 ENCOUNTER — APPOINTMENT (OUTPATIENT)
Dept: INTERNAL MEDICINE | Age: 35
End: 2025-01-08

## 2025-01-18 DIAGNOSIS — F41.1 GAD (GENERALIZED ANXIETY DISORDER): ICD-10-CM

## 2025-01-20 RX ORDER — ESCITALOPRAM OXALATE 5 MG/1
5 TABLET ORAL DAILY
Qty: 30 TABLET | Refills: 1 | Status: SHIPPED | OUTPATIENT
Start: 2025-01-20 | End: 2025-02-25

## 2025-02-22 DIAGNOSIS — F41.1 GAD (GENERALIZED ANXIETY DISORDER): ICD-10-CM

## 2025-02-25 RX ORDER — ESCITALOPRAM OXALATE 5 MG/1
5 TABLET ORAL DAILY
Qty: 90 TABLET | Refills: 1 | Status: SHIPPED | OUTPATIENT
Start: 2025-02-25